# Patient Record
Sex: MALE | Race: WHITE
[De-identification: names, ages, dates, MRNs, and addresses within clinical notes are randomized per-mention and may not be internally consistent; named-entity substitution may affect disease eponyms.]

---

## 2018-04-12 ENCOUNTER — HOSPITAL ENCOUNTER (OUTPATIENT)
Dept: HOSPITAL 17 - HCAV | Age: 68
End: 2018-04-12
Payer: COMMERCIAL

## 2018-04-12 DIAGNOSIS — I10: Primary | ICD-10-CM

## 2018-04-12 PROCEDURE — 93005 ELECTROCARDIOGRAM TRACING: CPT

## 2018-04-12 NOTE — EKG
Date Performed: 2018       Time Performed: 08:33:04

 

PTAGE:      67 years

 

EKG:      Sinus bradycardia. Compared to 

 

 PREVIOUS TRACING             sinus rate is slower Normal ECG except for rate PREVIOUS TRACIN 15.26

 

DOCTOR:   Marbin Wood  Interpretating Date/Time  2018 21:19:49

## 2018-04-18 ENCOUNTER — HOSPITAL ENCOUNTER (OUTPATIENT)
Dept: HOSPITAL 17 - PHSDC | Age: 68
Discharge: HOME | End: 2018-04-18
Payer: COMMERCIAL

## 2018-04-18 VITALS — BODY MASS INDEX: 23.18 KG/M2 | WEIGHT: 156.53 LBS | HEIGHT: 69 IN

## 2018-04-18 VITALS
DIASTOLIC BLOOD PRESSURE: 69 MMHG | HEART RATE: 56 BPM | OXYGEN SATURATION: 98 % | RESPIRATION RATE: 16 BRPM | SYSTOLIC BLOOD PRESSURE: 121 MMHG

## 2018-04-18 VITALS — HEART RATE: 62 BPM

## 2018-04-18 VITALS — TEMPERATURE: 97.5 F

## 2018-04-18 VITALS — HEART RATE: 74 BPM

## 2018-04-18 DIAGNOSIS — H25.12: Primary | ICD-10-CM

## 2018-04-18 DIAGNOSIS — I10: ICD-10-CM

## 2018-04-18 PROCEDURE — V2632 POST CHMBR INTRAOCULAR LENS: HCPCS

## 2018-04-18 PROCEDURE — 00142 ANES PX ON EYE LENS SURGERY: CPT

## 2018-04-18 PROCEDURE — 66984 XCAPSL CTRC RMVL W/O ECP: CPT

## 2018-04-18 RX ADMIN — TROPICAMIDE SCH DROP: 10 SOLUTION/ DROPS OPHTHALMIC at 08:05

## 2018-04-18 RX ADMIN — TETRACAINE HYDROCHLORIDE SCH DROP: 5 SOLUTION OPHTHALMIC at 08:00

## 2018-04-18 RX ADMIN — TETRACAINE HYDROCHLORIDE SCH DROP: 5 SOLUTION OPHTHALMIC at 08:10

## 2018-04-18 RX ADMIN — CYCLOPENTOLATE HYDROCHLORIDE SCH DROP: 10 SOLUTION/ DROPS OPHTHALMIC at 08:05

## 2018-04-18 RX ADMIN — CYCLOPENTOLATE HYDROCHLORIDE SCH DROP: 10 SOLUTION/ DROPS OPHTHALMIC at 08:10

## 2018-04-18 RX ADMIN — TETRACAINE HYDROCHLORIDE SCH DROP: 5 SOLUTION OPHTHALMIC at 08:05

## 2018-04-18 RX ADMIN — CYCLOPENTOLATE HYDROCHLORIDE SCH DROP: 10 SOLUTION/ DROPS OPHTHALMIC at 08:00

## 2018-04-18 RX ADMIN — TROPICAMIDE SCH DROP: 10 SOLUTION/ DROPS OPHTHALMIC at 08:10

## 2018-04-18 RX ADMIN — TROPICAMIDE SCH DROP: 10 SOLUTION/ DROPS OPHTHALMIC at 08:00

## 2018-04-18 NOTE — PD.OP
__________________________________________________





Operative Report


Date of Surgery:  Apr 18, 2018


Preoperative Diagnosis:  


(1) Cataract mature, total senile


Postoperative Diagnosis:  


(1) Pseudophakia of left eye


Procedure:


phacoemulsification and intraocular lens implant left eye


Anesthesia:


retrobulbar block, MAC


Surgeon:


Rubi Francois


Assistant(s):


none


Operation and Findings:


Patient was consented for surgery, given a retrobulbar block by anesthesia, and 

taken back to the operating room. He was prepped and draped in the usual 

sterile fashion for ophthalmic surgery. A wire lid speculum was placed in the 

left eye. A paracentesis incision was created at the 5 o'clock position on the 

limbus. Vision blue dye, intracameral Epishugarcaine, and viscoelastic was 

injected into the anterior chamber. The main incision was created at the 2 o'

clock position on the limbus with a 2.4 mm keratome. A continuous curvilinear 

capsulorrhexis was made on the anterior lens capsule. Hydrodissection was used 

to separate the lens from the capsule. Phacoemulsification was used to remove 

the lens nucleus material. Irrigation and aspiration was used to remove the 

remaining cortical material. The lens implant ([id number] SN [id number]) was 

placed in the capsular bag. Viscoelastic was removed with irrigation and 

aspiration. The incisions were irrigated and found to be watertight. Tobradex 

ointment, a patch, and shield were placed on the left eye. The patient was sent 

to PACU in stable condition.











Rubi Francois MD Apr 18, 2018 09:52